# Patient Record
Sex: FEMALE | Race: OTHER | HISPANIC OR LATINO | Employment: FULL TIME | URBAN - METROPOLITAN AREA
[De-identification: names, ages, dates, MRNs, and addresses within clinical notes are randomized per-mention and may not be internally consistent; named-entity substitution may affect disease eponyms.]

---

## 2022-04-13 ENCOUNTER — OFFICE VISIT (OUTPATIENT)
Dept: OBGYN CLINIC | Facility: CLINIC | Age: 27
End: 2022-04-13
Payer: COMMERCIAL

## 2022-04-13 VITALS
SYSTOLIC BLOOD PRESSURE: 118 MMHG | DIASTOLIC BLOOD PRESSURE: 80 MMHG | WEIGHT: 121 LBS | BODY MASS INDEX: 23.75 KG/M2 | HEIGHT: 60 IN

## 2022-04-13 DIAGNOSIS — Z01.419 ENCOUNTER FOR GYNECOLOGICAL EXAMINATION (GENERAL) (ROUTINE) WITHOUT ABNORMAL FINDINGS: Primary | ICD-10-CM

## 2022-04-13 DIAGNOSIS — E01.0 THYROMEGALY: ICD-10-CM

## 2022-04-13 PROCEDURE — 0503F POSTPARTUM CARE VISIT: CPT | Performed by: PHYSICIAN ASSISTANT

## 2022-04-13 PROCEDURE — G0143 SCR C/V CYTO,THINLAYER,RESCR: HCPCS | Performed by: PHYSICIAN ASSISTANT

## 2022-04-13 PROCEDURE — 99385 PREV VISIT NEW AGE 18-39: CPT | Performed by: PHYSICIAN ASSISTANT

## 2022-04-13 NOTE — PROGRESS NOTES
Assessment/Plan   Diagnoses and all orders for this visit:    Encounter for gynecological examination (general) (routine) without abnormal findings  -     Liquid-based pap, screening  The current ASCCP guidelines were reviewed  Patient's last pap was around 2020 - WNL per patient and therefore, a pap with reflex HPV cotesting is indicated at this time  I emphasized the importance of an annual pelvic and breast exam  Patient ok to have a pap done today  Thyromegaly  Ordered thyroid studies and thyroid ultrasound - will call to review results    Discussion  I have discussed the importance of monthly self-breast exams, exercise and healthy diet as well as adequate intake of calcium and vitamin D  Encourage MVI q day and r/zack importance of folic acid; Encourage 30-40 min weight bearing exercise most days of week  Encourage safe sexual practices; STI testing - declines  Contraception - BTL; inquired if desires future pregnancy - would need to consult reproductive endocrinology/fertility specialist - would discuss if tubal reversal is possible - risks/benefits vs IVF; The patient has had the Gardasil vaccine series, which is recommended for patients from 545 years of age  All questions have been answered to her satisfaction  RTO for APE or sooner if needed    Subjective     HPI   Brenda Res is a 31 y o  female who presents for annual well woman exam as a new patient  Menarche - 14; LMP - 3/31/22; Periods are reg q month and last 3-4 days; No excessive bleeding; No intermenstrual bleeding or spotting; Cramps are tolerable  No vulvar itch/burn; No vaginal itch/burn; No abn discharge or odor; No urinary sx - burning/pain/frequency/hematuria  (+) SBEs - no breast masses, asymmetry, nipple discharge or bleeding, changes in skin of breast, or breast tenderness bilaterally  No abd/pelvic pain or HAs;   Pt is sexually active in a mutually monog/ sexual relationship; No issues with intercourse;  She declines sti/hiv/hep testing; Feels safe at home  Current contraception: BTL; inquired if desires future pregnancy  Gardasil - completed series  (+) PCP for routine Bw/care; Last Pap -  - WNL per patient  History of abnormal Pap smear: no    Review of Systems   Constitutional: Negative for activity change, fatigue, fever and unexpected weight change  HENT: Negative for congestion, dental problem, sinus pressure and sinus pain  Eyes: Negative for visual disturbance  Respiratory: Negative for cough, shortness of breath and wheezing  Cardiovascular: Negative for chest pain and leg swelling  Gastrointestinal: Negative for abdominal distention, abdominal pain, blood in stool, constipation, diarrhea, nausea and vomiting  Endocrine: Negative for polydipsia  Genitourinary: Negative for difficulty urinating, dyspareunia, dysuria, frequency, hematuria, menstrual problem, pelvic pain, urgency, vaginal bleeding, vaginal discharge and vaginal pain  Musculoskeletal: Negative for arthralgias and back pain  Allergic/Immunologic: Negative for environmental allergies  Neurological: Negative for dizziness, seizures and headaches  Psychiatric/Behavioral: Negative for dysphoric mood and sleep disturbance  The patient is not nervous/anxious  The following portions of the patient's history were reviewed and updated as appropriate: allergies, current medications, past family history, past medical history, past social history, past surgical history and problem list          OB History        4    Para   3    Term   2       1    AB   1    Living   3       SAB   0    IAB   1    Ectopic        Multiple        Live Births   3           Obstetric Comments   : 1 prim [de-identified] F unsure reason since done in ;  repeat LTCS ;  T18 22 wks F D&E;  repeat C/S with BTL F 33wks  labor; History reviewed  No pertinent past medical history      Past Surgical History:   Procedure Laterality Date     SECTION      x 3    GASTRIC BYPASS      TUBAL LIGATION         Family History   Problem Relation Age of Onset    No Known Problems Mother     No Known Problems Father        Social History     Socioeconomic History    Marital status: /Civil Union     Spouse name: Not on file    Number of children: Not on file    Years of education: Not on file    Highest education level: Not on file   Occupational History    Not on file   Tobacco Use    Smoking status: Never Smoker    Smokeless tobacco: Never Used   Vaping Use    Vaping Use: Never used   Substance and Sexual Activity    Alcohol use: Not Currently    Drug use: Not Currently    Sexual activity: Yes     Partners: Male     Birth control/protection: Female Sterilization     Comment:  BTL with last C/S   Other Topics Concern    Not on file   Social History Narrative    Not on file     Social Determinants of Health     Financial Resource Strain: Not on file   Food Insecurity: Not on file   Transportation Needs: Not on file   Physical Activity: Not on file   Stress: Not on file   Social Connections: Not on file   Intimate Partner Violence: Not on file   Housing Stability: Not on file         Current Outpatient Medications:     MULTIPLE VITAMIN PO, Take by mouth, Disp: , Rfl:     No Known Allergies    Objective   Vitals:    22 1000   BP: 118/80   BP Location: Left arm   Patient Position: Sitting   Cuff Size: Standard   Weight: 54 9 kg (121 lb)   Height: 5' (1 524 m)     Physical Exam  Vitals reviewed  Constitutional:       General: She is awake  She is not in acute distress  Appearance: Normal appearance  She is well-developed, well-groomed and normal weight  She is not ill-appearing, toxic-appearing or diaphoretic  HENT:      Head: Normocephalic and atraumatic  Eyes:      Conjunctiva/sclera: Conjunctivae normal    Neck:      Thyroid: Thyromegaly (B/L) present   No thyroid mass or thyroid tenderness  Cardiovascular:      Rate and Rhythm: Normal rate and regular rhythm  Heart sounds: Normal heart sounds  No murmur heard  Pulmonary:      Effort: Pulmonary effort is normal  No tachypnea, bradypnea or respiratory distress  Breath sounds: Normal breath sounds  No stridor or decreased air movement  No wheezing  Chest:   Breasts: Breasts are symmetrical       Right: Normal  No swelling, bleeding, inverted nipple, mass, nipple discharge, skin change, tenderness, axillary adenopathy or supraclavicular adenopathy  Left: Normal  No swelling, bleeding, inverted nipple, mass, nipple discharge, skin change, tenderness, axillary adenopathy or supraclavicular adenopathy  Abdominal:      General: There is no distension  Palpations: Abdomen is soft  There is no hepatomegaly, splenomegaly or mass  Tenderness: There is no abdominal tenderness  Hernia: No hernia is present  There is no hernia in the left inguinal area or right inguinal area  Genitourinary:     General: Normal vulva  Exam position: Supine  Pubic Area: No rash or pubic lice  Labia:         Right: No rash, tenderness, lesion or injury  Left: No rash, tenderness, lesion or injury  Urethra: No prolapse, urethral pain, urethral swelling or urethral lesion  Vagina: Normal  No signs of injury and foreign body  No vaginal discharge, erythema, tenderness, bleeding, lesions or prolapsed vaginal walls  Cervix: No cervical motion tenderness, discharge, friability, lesion, erythema or cervical bleeding  Uterus: Not deviated, not enlarged, not fixed, not tender and no uterine prolapse  Adnexa:         Right: No mass, tenderness or fullness  Left: No mass, tenderness or fullness  Comments: Needed long speculum to see cervix  Lymphadenopathy:      Cervical: No cervical adenopathy        Upper Body:      Right upper body: No supraclavicular or axillary adenopathy  Left upper body: No supraclavicular or axillary adenopathy  Lower Body: No right inguinal adenopathy  No left inguinal adenopathy  Skin:     General: Skin is warm and dry  Neurological:      Mental Status: She is alert and oriented to person, place, and time  Psychiatric:         Mood and Affect: Mood and affect normal          Speech: Speech normal          Behavior: Behavior normal  Behavior is cooperative  Thought Content:  Thought content normal          Judgment: Judgment normal

## 2022-04-13 NOTE — ASSESSMENT & PLAN NOTE
The current ASCCP guidelines were reviewed  Patient's last pap was around 2020 - WNL per patient and therefore, a pap with reflex HPV cotesting is indicated at this time  I emphasized the importance of an annual pelvic and breast exam  Patient ok to have a pap done today

## 2022-04-20 LAB
LAB AP GYN PRIMARY INTERPRETATION: NORMAL
LAB AP LMP: NORMAL
Lab: NORMAL

## 2022-09-07 ENCOUNTER — TELEPHONE (OUTPATIENT)
Dept: OTHER | Facility: OTHER | Age: 27
End: 2022-09-07

## 2022-10-14 ENCOUNTER — OFFICE VISIT (OUTPATIENT)
Dept: FAMILY MEDICINE CLINIC | Facility: CLINIC | Age: 27
End: 2022-10-14
Payer: COMMERCIAL

## 2022-10-14 VITALS
TEMPERATURE: 97.2 F | WEIGHT: 137 LBS | BODY MASS INDEX: 26.9 KG/M2 | HEIGHT: 60 IN | SYSTOLIC BLOOD PRESSURE: 110 MMHG | DIASTOLIC BLOOD PRESSURE: 82 MMHG

## 2022-10-14 DIAGNOSIS — R53.83 FATIGUE, UNSPECIFIED TYPE: ICD-10-CM

## 2022-10-14 DIAGNOSIS — R14.0 ABDOMINAL BLOATING: ICD-10-CM

## 2022-10-14 DIAGNOSIS — Z13.1 SCREENING FOR DIABETES MELLITUS: ICD-10-CM

## 2022-10-14 DIAGNOSIS — R55 SYNCOPE, UNSPECIFIED SYNCOPE TYPE: ICD-10-CM

## 2022-10-14 DIAGNOSIS — Z00.00 ANNUAL PHYSICAL EXAM: Primary | ICD-10-CM

## 2022-10-14 DIAGNOSIS — Z13.220 SCREENING FOR LIPID DISORDERS: ICD-10-CM

## 2022-10-14 PROCEDURE — 99214 OFFICE O/P EST MOD 30 MIN: CPT

## 2022-10-14 PROCEDURE — 99385 PREV VISIT NEW AGE 18-39: CPT

## 2022-10-14 NOTE — PATIENT INSTRUCTIONS

## 2022-10-14 NOTE — ASSESSMENT & PLAN NOTE
Not on any medications, denies cardiac symtpoms  Check labs to r/o electrolyte imbalance, abnormal blood glucose, anemia  BP stable today advised to monitor at home

## 2022-10-14 NOTE — ASSESSMENT & PLAN NOTE
Hx of gastric sleeve last year, denies abdominal pain  Discussed avoiding trigger foods and starting pro-biotics  Eating smaller more frequent meals and avoiding carbonated beverages   Will f/u in 6 weeks

## 2022-10-14 NOTE — PROGRESS NOTES
[facility]    NAME: Toby Aquino  AGE: 31 y o  SEX: female  : 1995     DATE: 10/14/2022     Assessment and Plan:     Problem List Items Addressed This Visit        Other    Annual physical exam - Primary     Normal exam  Routine blood work ordered  Continue exercise discussed healthy eating habits  Syncope     Not on any medications, denies cardiac symtpoms  Check labs to r/o electrolyte imbalance, abnormal blood glucose, anemia  BP stable today advised to monitor at home  Relevant Orders    CBC and differential    Basic metabolic panel    Vitamin B12    Ferritin    Abdominal bloating     Hx of gastric sleeve last year, denies abdominal pain  Discussed avoiding trigger foods and starting pro-biotics  Eating smaller more frequent meals and avoiding carbonated beverages  Will f/u in 6 weeks         Fatigue     Blood work ordered  Relevant Orders    Vitamin B12      Other Visit Diagnoses     BMI 26 0-26 9,adult        Screening for diabetes mellitus        Relevant Orders    Hemoglobin A1C    Screening for lipid disorders        Relevant Orders    Lipid panel          Immunizations and preventive care screenings were discussed with patient today  Appropriate education was printed on patient's after visit summary  Counseling:  Alcohol/drug use: discussed moderation in alcohol intake, the recommendations for healthy alcohol use, and avoidance of illicit drug use  · Sexual health: discussed sexually transmitted diseases, partner selection, use of condoms, avoidance of unintended pregnancy, and contraceptive alternatives  BMI Counseling: Body mass index is 26 76 kg/m²  The BMI is above normal  Nutrition recommendations include encouraging healthy choices of fruits and vegetables and limiting drinks that contain sugar  Exercise recommendations include exercising 3-5 times per week   Rationale for BMI follow-up plan is due to patient being overweight or obese  Depression Screening and Follow-up Plan: Patient was screened for depression during today's encounter  They screened negative with a PHQ-2 score of 0  Return in about 6 weeks (around 11/25/2022), or if symptoms worsen or fail to improve, for Recheck abdominal discomfort  Chief Complaint:     Chief Complaint   Patient presents with   • Physical Exam      History of Present Illness:     Adult Annual Physical   Patient here for a comprehensive physical exam and to establish care  The patient reports problems - Stomach issues had gastric sleeve done last year in [facility]  Has been having abdominal bloating and gurgling  Passed out several times in the past month states she gets dizzy and has syncopal episodes  Denies any family hx of heart disease, arrhythmias, and denies pregnancy secondary to tubal ligation  She is not on any medications except multi-vitamins  Will check blood work to r/o anemia, abnormal blood glucose, electrolyte imbalance  Diet and Physical Activity  · Diet/Nutrition: well balanced diet, limited junk food, consuming 3-5 servings of fruits/vegetables daily, limited fruits/vegetables and adequate whole grain intake  · Exercise: moderate cardiovascular exercise, vigorous cardiovascular exercise, 3-4 times a week on average and 30-60 minutes on average  Depression Screening  PHQ-2/9 Depression Screening    Little interest or pleasure in doing things: 0 - not at all  Feeling down, depressed, or hopeless: 0 - not at all  PHQ-2 Score: 0  PHQ-2 Interpretation: Negative depression screen       General Health  · Sleep: sleeps well and gets 7-8 hours of sleep on average  · Hearing: normal - bilateral   · Vision: no vision problems, goes for regular eye exams and wears glasses  · Dental: regular dental visits and brushes teeth twice daily         /GYN Health  · Last menstrual period: 10/11/2022  · Contraceptive method: Tubal ligation  · History of STDs?: no      Review of Systems:     Review of Systems   Constitutional: Positive for fatigue  Negative for activity change, chills and fever  HENT: Negative for congestion, ear pain and sore throat  Eyes: Negative for pain, discharge and visual disturbance  Respiratory: Negative for cough, chest tightness and shortness of breath  Cardiovascular: Negative for chest pain and palpitations  Gastrointestinal: Negative for abdominal distention, abdominal pain, diarrhea, nausea, rectal pain and vomiting  Endocrine: Negative for cold intolerance  Genitourinary: Negative for difficulty urinating, dysuria, flank pain, frequency, genital sores and hematuria  Musculoskeletal: Negative for arthralgias and back pain  Skin: Negative for color change and rash  Allergic/Immunologic: Negative for environmental allergies  Neurological: Positive for dizziness, syncope and light-headedness  Negative for seizures  Psychiatric/Behavioral: Negative for agitation  All other systems reviewed and are negative  Past Medical History:     History reviewed  No pertinent past medical history     Past Surgical History:     Past Surgical History:   Procedure Laterality Date   •  SECTION      x 3   • GASTRIC BYPASS     • TUBAL LIGATION        Social History:     Social History     Socioeconomic History   • Marital status: /Civil Union     Spouse name: None   • Number of children: None   • Years of education: None   • Highest education level: None   Occupational History   • None   Tobacco Use   • Smoking status: Never Smoker   • Smokeless tobacco: Never Used   Vaping Use   • Vaping Use: Never used   Substance and Sexual Activity   • Alcohol use: Not Currently   • Drug use: Not Currently   • Sexual activity: Yes     Partners: Male     Birth control/protection: Female Sterilization     Comment:  BTL with last C/S   Other Topics Concern   • None   Social History Narrative   • None     Social Determinants of Health     Financial Resource Strain: Not on file   Food Insecurity: Not on file   Transportation Needs: Not on file   Physical Activity: Not on file   Stress: Not on file   Social Connections: Not on file   Intimate Partner Violence: Not on file   Housing Stability: Not on file      Family History:     Family History   Problem Relation Age of Onset   • No Known Problems Mother    • No Known Problems Father       Current Medications:     Current Outpatient Medications   Medication Sig Dispense Refill   • MULTIPLE VITAMIN PO Take by mouth       No current facility-administered medications for this visit  Allergies:     No Known Allergies   Physical Exam:     /82 (BP Location: Right arm, Patient Position: Sitting, Cuff Size: Standard)   Temp (!) 97 2 °F (36 2 °C) (Temporal)   Ht 5' (1 524 m)   Wt 62 1 kg (137 lb)   BMI 26 76 kg/m²     Physical Exam  Vitals and nursing note reviewed  Constitutional:       General: She is not in acute distress  Appearance: Normal appearance  She is well-developed and normal weight  She is not ill-appearing or toxic-appearing  HENT:      Head: Normocephalic and atraumatic  Right Ear: Tympanic membrane, ear canal and external ear normal  There is no impacted cerumen  Left Ear: Tympanic membrane, ear canal and external ear normal  There is no impacted cerumen  Nose: Nose normal  No congestion or rhinorrhea  Mouth/Throat:      Mouth: Mucous membranes are moist       Pharynx: No oropharyngeal exudate or posterior oropharyngeal erythema  Eyes:      General: No scleral icterus  Right eye: No discharge  Left eye: No discharge  Extraocular Movements: Extraocular movements intact  Conjunctiva/sclera: Conjunctivae normal       Pupils: Pupils are equal, round, and reactive to light  Neck:      Vascular: No carotid bruit     Cardiovascular:      Rate and Rhythm: Normal rate and regular rhythm  Chest Wall: PMI is not displaced  Pulses: Normal pulses  Heart sounds: Normal heart sounds, S1 normal and S2 normal  No murmur heard  No friction rub  No gallop  Pulmonary:      Effort: Pulmonary effort is normal  No respiratory distress  Breath sounds: Normal breath sounds  No wheezing  Chest:      Chest wall: No tenderness  Abdominal:      General: Abdomen is flat  Bowel sounds are normal  There is no distension  Palpations: Abdomen is soft  Tenderness: There is no abdominal tenderness  There is no right CVA tenderness, left CVA tenderness or rebound  Hernia: No hernia is present  Genitourinary:     Vagina: No vaginal discharge  Musculoskeletal:         General: No swelling, tenderness, deformity or signs of injury  Normal range of motion  Cervical back: Normal range of motion and neck supple  No rigidity or tenderness  Right lower leg: No edema  Left lower leg: No edema  Lymphadenopathy:      Cervical: No cervical adenopathy  Skin:     General: Skin is warm and dry  Capillary Refill: Capillary refill takes less than 2 seconds  Findings: No erythema, lesion or rash  Neurological:      General: No focal deficit present  Mental Status: She is alert and oriented to person, place, and time  Mental status is at baseline  Cranial Nerves: No cranial nerve deficit  Sensory: No sensory deficit  Motor: No weakness  Coordination: Coordination normal       Gait: Gait normal       Deep Tendon Reflexes: Reflexes normal    Psychiatric:         Mood and Affect: Mood normal          Behavior: Behavior normal          Thought Content:  Thought content normal          Judgment: Judgment normal           TYRA Rao   [address] [place] [address]

## 2022-10-17 ENCOUNTER — APPOINTMENT (OUTPATIENT)
Dept: LAB | Facility: CLINIC | Age: 27
End: 2022-10-17
Payer: COMMERCIAL

## 2022-10-17 DIAGNOSIS — Z13.1 SCREENING FOR DIABETES MELLITUS: ICD-10-CM

## 2022-10-17 DIAGNOSIS — E01.0 THYROMEGALY: ICD-10-CM

## 2022-10-17 DIAGNOSIS — Z13.220 SCREENING FOR LIPID DISORDERS: ICD-10-CM

## 2022-10-17 DIAGNOSIS — R55 SYNCOPE, UNSPECIFIED SYNCOPE TYPE: ICD-10-CM

## 2022-10-17 DIAGNOSIS — R53.83 FATIGUE, UNSPECIFIED TYPE: ICD-10-CM

## 2022-10-17 LAB
ANION GAP SERPL CALCULATED.3IONS-SCNC: 7 MMOL/L (ref 4–13)
BASOPHILS # BLD AUTO: 0.02 THOUSANDS/ΜL (ref 0–0.1)
BASOPHILS NFR BLD AUTO: 0 % (ref 0–1)
BUN SERPL-MCNC: 11 MG/DL (ref 5–25)
CALCIUM SERPL-MCNC: 9.7 MG/DL (ref 8.4–10.2)
CHLORIDE SERPL-SCNC: 104 MMOL/L (ref 96–108)
CHOLEST SERPL-MCNC: 169 MG/DL
CO2 SERPL-SCNC: 27 MMOL/L (ref 21–32)
CREAT SERPL-MCNC: 0.62 MG/DL (ref 0.6–1.3)
EOSINOPHIL # BLD AUTO: 0.07 THOUSAND/ΜL (ref 0–0.61)
EOSINOPHIL NFR BLD AUTO: 1 % (ref 0–6)
ERYTHROCYTE [DISTWIDTH] IN BLOOD BY AUTOMATED COUNT: 13.3 % (ref 11.6–15.1)
FERRITIN SERPL-MCNC: 13 NG/ML (ref 8–388)
GFR SERPL CREATININE-BSD FRML MDRD: 123 ML/MIN/1.73SQ M
GLUCOSE P FAST SERPL-MCNC: 74 MG/DL (ref 65–99)
HCT VFR BLD AUTO: 39.6 % (ref 34.8–46.1)
HDLC SERPL-MCNC: 52 MG/DL
HGB BLD-MCNC: 12.9 G/DL (ref 11.5–15.4)
IMM GRANULOCYTES # BLD AUTO: 0.02 THOUSAND/UL (ref 0–0.2)
IMM GRANULOCYTES NFR BLD AUTO: 0 % (ref 0–2)
LDLC SERPL CALC-MCNC: 107 MG/DL (ref 0–100)
LYMPHOCYTES # BLD AUTO: 2.46 THOUSANDS/ΜL (ref 0.6–4.47)
LYMPHOCYTES NFR BLD AUTO: 38 % (ref 14–44)
MCH RBC QN AUTO: 29.1 PG (ref 26.8–34.3)
MCHC RBC AUTO-ENTMCNC: 32.6 G/DL (ref 31.4–37.4)
MCV RBC AUTO: 89 FL (ref 82–98)
MONOCYTES # BLD AUTO: 0.52 THOUSAND/ΜL (ref 0.17–1.22)
MONOCYTES NFR BLD AUTO: 8 % (ref 4–12)
NEUTROPHILS # BLD AUTO: 3.43 THOUSANDS/ΜL (ref 1.85–7.62)
NEUTS SEG NFR BLD AUTO: 53 % (ref 43–75)
NONHDLC SERPL-MCNC: 117 MG/DL
NRBC BLD AUTO-RTO: 0 /100 WBCS
PLATELET # BLD AUTO: 274 THOUSANDS/UL (ref 149–390)
PMV BLD AUTO: 10.4 FL (ref 8.9–12.7)
POTASSIUM SERPL-SCNC: 3.8 MMOL/L (ref 3.5–5.3)
RBC # BLD AUTO: 4.43 MILLION/UL (ref 3.81–5.12)
SODIUM SERPL-SCNC: 138 MMOL/L (ref 135–147)
TRIGL SERPL-MCNC: 50 MG/DL
TSH SERPL DL<=0.05 MIU/L-ACNC: 2.62 UIU/ML (ref 0.45–4.5)
VIT B12 SERPL-MCNC: 300 PG/ML (ref 100–900)
WBC # BLD AUTO: 6.52 THOUSAND/UL (ref 4.31–10.16)

## 2022-10-17 PROCEDURE — 80048 BASIC METABOLIC PNL TOTAL CA: CPT

## 2022-10-17 PROCEDURE — 80061 LIPID PANEL: CPT

## 2022-10-17 PROCEDURE — 82607 VITAMIN B-12: CPT

## 2022-10-17 PROCEDURE — 36415 COLL VENOUS BLD VENIPUNCTURE: CPT

## 2022-10-17 PROCEDURE — 85025 COMPLETE CBC W/AUTO DIFF WBC: CPT

## 2022-10-17 PROCEDURE — 82728 ASSAY OF FERRITIN: CPT

## 2022-10-17 PROCEDURE — 84443 ASSAY THYROID STIM HORMONE: CPT

## 2022-10-17 PROCEDURE — 83036 HEMOGLOBIN GLYCOSYLATED A1C: CPT

## 2022-10-18 LAB
EST. AVERAGE GLUCOSE BLD GHB EST-MCNC: 100 MG/DL
HBA1C MFR BLD: 5.1 %

## 2022-12-01 PROBLEM — Z00.00 ANNUAL PHYSICAL EXAM: Status: RESOLVED | Noted: 2022-10-14 | Resolved: 2022-12-01

## 2022-12-01 PROBLEM — Z01.419 ENCOUNTER FOR GYNECOLOGICAL EXAMINATION (GENERAL) (ROUTINE) WITHOUT ABNORMAL FINDINGS: Status: RESOLVED | Noted: 2022-04-13 | Resolved: 2022-12-01

## 2023-03-06 ENCOUNTER — HOSPITAL ENCOUNTER (EMERGENCY)
Facility: HOSPITAL | Age: 28
Discharge: HOME | End: 2023-03-06
Attending: INTERNAL MEDICINE

## 2023-03-06 ENCOUNTER — APPOINTMENT (EMERGENCY)
Dept: ULTRASOUND IMAGING | Facility: HOSPITAL | Age: 28
End: 2023-03-06

## 2023-03-06 VITALS
HEART RATE: 91 BPM | OXYGEN SATURATION: 98 % | DIASTOLIC BLOOD PRESSURE: 57 MMHG | SYSTOLIC BLOOD PRESSURE: 100 MMHG | TEMPERATURE: 98.5 F | RESPIRATION RATE: 18 BRPM

## 2023-03-06 DIAGNOSIS — R10.30 LOWER ABDOMINAL PAIN: Primary | ICD-10-CM

## 2023-03-06 DIAGNOSIS — N83.201 CYST OF RIGHT OVARY: ICD-10-CM

## 2023-03-06 LAB
EXT PREGNANCY TEST URINE: NEGATIVE
EXT. CONTROL: NORMAL

## 2023-03-06 NOTE — Clinical Note
Roxanne Padgett was seen and treated in our emergency department on 3/6/2023  Diagnosis:     Alfred Diane  may return to work on return date  She may return on this date: 03/07/2023         If you have any questions or concerns, please don't hesitate to call        Cole Jhaveri MD    ______________________________           _______________          _______________  Hospital Representative                              Date                                Time

## 2023-03-06 NOTE — ED PROVIDER NOTES
History  Chief Complaint   Patient presents with   • Pelvic Pain     Pt c/o intermittent pelvic pain that started about a week ago and lower abdominal pain  Pt states there is a chance of pregnancy, + nausea/vomiting, - diarrhea  LMP approx 1 month ago     Kiko Juan is a 48-year-old female presenting due to right lower quadrant/lower abdominal pelvic pain  Patient history of gastric sleeve done in [facility] ([place] about a year ago, states 3 weeks ago she started having right lower quadrant abdominal pain, on and off throughout the day, worsened by eating and lifting heavy objects with nausea and vomiting  Nausea and vomiting is typically after eating but has also had episodes of waking up from sleep and vomiting bilious vomit  Patient denies fever, chills, recent illness, diarrhea, vaginal discharge/bleeding/pain, rash, trauma, chest pain, shortness of breath  Prior to Admission Medications   Prescriptions Last Dose Informant Patient Reported? Taking? MULTIPLE VITAMIN PO   Yes No   Sig: Take by mouth      Facility-Administered Medications: None       History reviewed  No pertinent past medical history  Past Surgical History:   Procedure Laterality Date   •  SECTION      x 3   • GASTRIC BYPASS     • TUBAL LIGATION  2020       Family History   Problem Relation Age of Onset   • No Known Problems Mother    • No Known Problems Father      I have reviewed and agree with the history as documented  E-Cigarette/Vaping   • E-Cigarette Use Never User      E-Cigarette/Vaping Substances     Social History     Tobacco Use   • Smoking status: Never   • Smokeless tobacco: Never   Vaping Use   • Vaping Use: Never used   Substance Use Topics   • Alcohol use: Not Currently   • Drug use: Not Currently        Review of Systems   Constitutional: Negative for chills and fever  HENT: Negative for congestion, ear pain, rhinorrhea and sore throat  Eyes: Negative for pain and visual disturbance     Respiratory: Negative for cough and shortness of breath  Cardiovascular: Negative for chest pain and palpitations  Gastrointestinal: Positive for nausea and vomiting  Negative for abdominal pain, blood in stool, constipation and diarrhea  Genitourinary: Positive for pelvic pain  Negative for dysuria, flank pain, hematuria, vaginal bleeding, vaginal discharge and vaginal pain  Musculoskeletal: Negative for arthralgias, back pain, neck pain and neck stiffness  Skin: Negative for color change and rash  Neurological: Negative for dizziness, seizures, syncope, facial asymmetry, weakness, light-headedness and headaches  All other systems reviewed and are negative  Physical Exam  ED Triage Vitals [03/06/23 1457]   Temperature Pulse Respirations Blood Pressure SpO2   98 5 °F (36 9 °C) 91 18 100/57 98 %      Temp Source Heart Rate Source Patient Position - Orthostatic VS BP Location FiO2 (%)   Oral Monitor Sitting Right arm --      Pain Score       7             Orthostatic Vital Signs  Vitals:    03/06/23 1457   BP: 100/57   Pulse: 91   Patient Position - Orthostatic VS: Sitting       Physical Exam  Vitals and nursing note reviewed  Constitutional:       General: She is not in acute distress  Appearance: She is well-developed  HENT:      Head: Normocephalic and atraumatic  Nose: Nose normal  No congestion  Eyes:      Extraocular Movements: Extraocular movements intact  Conjunctiva/sclera: Conjunctivae normal    Cardiovascular:      Rate and Rhythm: Normal rate and regular rhythm  Pulses: Normal pulses  Heart sounds: Normal heart sounds  No murmur heard  Pulmonary:      Effort: Pulmonary effort is normal  No respiratory distress  Breath sounds: Normal breath sounds  Chest:      Chest wall: No tenderness  Abdominal:      General: Abdomen is flat  Bowel sounds are normal       Palpations: Abdomen is soft  Tenderness:  There is abdominal tenderness (Right lower quadrant/pelvic abdominal tenderness on exam )  There is no right CVA tenderness or left CVA tenderness  Musculoskeletal:         General: No deformity or signs of injury  Normal range of motion  Cervical back: Normal range of motion and neck supple  No rigidity or tenderness  Skin:     General: Skin is warm and dry  Findings: No bruising, lesion or rash  Neurological:      General: No focal deficit present  Mental Status: She is alert  Cranial Nerves: No cranial nerve deficit  Sensory: No sensory deficit  ED Medications  Medications - No data to display    Diagnostic Studies  Results Reviewed     Procedure Component Value Units Date/Time    POCT pregnancy, urine [[id number]]  (Normal) Resulted: 03/06/23 1626    Lab Status: Final result Updated: 03/06/23 1626     EXT Preg Test, Ur Negative     Control Valid                 US pelvis complete w transvaginal   Final Result by Linda Josue MD (03/06 1741)       No sonographic evidence for ovarian torsion  No evidence of ovarian cyst    Right small paraovarian 1 1 cm simple cyst                            Workstation performed: [id number]               Procedures  Procedures      ED Course  ED Course as of 03/06/23 2241   Mon Mar 06, 2023   1631 POCT pregnancy, urine  Negative   125 25-year-old female presenting due to lower abdominal pain for 3 weeks  History of gastric band surgery  Physical exam shows slight right lower quadrant with no external signs of rash or trauma  Patient does not appear to be in distress, will order pelvic ultrasound for evaluation of ovarian torsion versus ovarian cyst   Appendicitis less likely due to duration of pain and minimal tenderness on exam   Patient does have history of tubal ligation, urine pregnancy negative in emergency department   9008 FINDINGS:     UTERUS:  The uterus is retroverted in position, measuring 9 0 x 3 4 x 5 2 cm  Nonspecific diffusely heterogeneous uterine echotexture    The cervix appears within normal limits      ENDOMETRIUM:    The endometrial echo complex has an AP caliber of 9 0 mm  Its appearance is within normal limits for age and cycle and shows no filling defects        OVARIES/ADNEXA:  Right ovary:  3 0 x 2 9 x 1 6 cm  7 4 mL  Left ovary:  2 1 x 1 7 x 2 5 cm  4 6 mL  Ovarian Doppler flow is within normal limits  No suspicious ovarian or adnexal abnormality      OTHER:  No free fluid or loculated fluid collections  Right small paraovarian 1 1 cm simple cyst         IMPRESSION:     No sonographic evidence for ovarian torsion  No evidence of ovarian cyst   Right small paraovarian 1 1 cm simple cyst      Workstation performed: [id number]   [id number] No current signs of ovarian torsion, small right sided 1 1 cm paraovarian simple cyst seen  Recommend follow up with Ob/gyn for reassessment  Monitor symptoms and contact PCP or return to the ED for new or worsening symptoms  Pt is agreeable and appropriate for d/c  MDM      Disposition  Final diagnoses:   Lower abdominal pain   Cyst of right ovary     Time reflects when diagnosis was documented in both MDM as applicable and the Disposition within this note     Time User Action Codes Description Comment    3/6/2023  5:59 PM Anuradha Mynor Add [R10 30] Lower abdominal pain     3/6/2023  5:59 PM Anuradha Mynor Add [F18 176] Torsion of paraovarian cyst     3/6/2023  5:59 PM Anuradha Mynor Remove [L59 944] Torsion of paraovarian cyst     3/6/2023  6:00 PM Anuradha Mynor Add [W84 106] Cyst of right ovary       ED Disposition     ED Disposition   Discharge    Condition   Stable    Date/Time   Mon Mar 6, 2023  6:03 PM    Comment   Britney Push discharge to home/self care                 Follow-up Information     Follow up With Specialties Details Why Contact Info Additional Information    [address] [facility] Obstetrics and Gynecology   [address]  [address] [phone] NT [facility], [address] [facility], [place], [place], [address] Emergency Department Emergency Medicine   [address] Emergency Department, [address], [place], [place], [address]          Discharge Medication List as of 3/6/2023  6:05 PM      CONTINUE these medications which have NOT CHANGED    Details   MULTIPLE VITAMIN PO Take by mouth, Historical Med           No discharge procedures on file  PDMP Review     None           ED Provider  Attending physically available and evaluated Dayna Nick  I managed the patient along with the ED Attending      Electronically Signed by         Pricilla Fernandez MD  03/06/23 7615

## 2023-03-06 NOTE — DISCHARGE INSTRUCTIONS
Follow up with your Ob/Gyn for reassessment and management of symptoms  Thank you for allowing us to take part in your care

## 2023-03-08 NOTE — ED ATTENDING ATTESTATION
3/6/2023  Chester BURKETT MD, saw and evaluated the patient  I have discussed the patient with the resident/non-physician practitioner and agree with the resident's/non-physician practitioner's findings, Plan of Care, and MDM as documented in the resident's/non-physician practitioner's note, except where noted  All available labs and Radiology studies were reviewed  I was present for key portions of any procedure(s) performed by the resident/non-physician practitioner and I was immediately available to provide assistance  At this point I agree with the current assessment done in the Emergency Department    I have conducted an independent evaluation of this patient a history and physical is as follows:see h and p above -agree with er resident tx pan/ dispo    ED Course  ED Course as of 03/08/23 1142   Mon Mar 06, 2023   1630 Er md note - pt states is not nauseous at present- and has recently taken tylenol for rlq pain - and at present does not want anything else for pain          Critical Care Time  Procedures

## 2023-05-18 ENCOUNTER — APPOINTMENT (EMERGENCY)
Dept: CT IMAGING | Facility: HOSPITAL | Age: 28
End: 2023-05-18

## 2023-05-18 ENCOUNTER — HOSPITAL ENCOUNTER (EMERGENCY)
Facility: HOSPITAL | Age: 28
Discharge: HOME | End: 2023-05-19
Attending: EMERGENCY MEDICINE

## 2023-05-18 VITALS
WEIGHT: 141.09 LBS | BODY MASS INDEX: 27.56 KG/M2 | HEART RATE: 68 BPM | RESPIRATION RATE: 16 BRPM | SYSTOLIC BLOOD PRESSURE: 125 MMHG | DIASTOLIC BLOOD PRESSURE: 82 MMHG | TEMPERATURE: 98.1 F | OXYGEN SATURATION: 99 %

## 2023-05-18 DIAGNOSIS — R11.0 NAUSEA: ICD-10-CM

## 2023-05-18 DIAGNOSIS — K29.70 GASTRITIS: Primary | ICD-10-CM

## 2023-05-18 LAB
ALBUMIN SERPL BCP-MCNC: 4.1 G/DL (ref 3.5–5)
ALP SERPL-CCNC: 51 U/L (ref 34–104)
ALT SERPL W P-5'-P-CCNC: 8 U/L (ref 7–52)
ANION GAP SERPL CALCULATED.3IONS-SCNC: 5 MMOL/L (ref 4–13)
AST SERPL W P-5'-P-CCNC: 11 U/L (ref 13–39)
BASOPHILS # BLD AUTO: 0.02 THOUSANDS/ÂΜL (ref 0–0.1)
BASOPHILS NFR BLD AUTO: 0 % (ref 0–1)
BILIRUB SERPL-MCNC: 0.25 MG/DL (ref 0.2–1)
BILIRUB UR QL STRIP: NEGATIVE
BUN SERPL-MCNC: 14 MG/DL (ref 5–25)
CALCIUM SERPL-MCNC: 9.4 MG/DL (ref 8.4–10.2)
CHLORIDE SERPL-SCNC: 103 MMOL/L (ref 96–108)
CLARITY UR: CLEAR
CO2 SERPL-SCNC: 29 MMOL/L (ref 21–32)
COLOR UR: NORMAL
CREAT SERPL-MCNC: 0.54 MG/DL (ref 0.6–1.3)
EOSINOPHIL # BLD AUTO: 0.1 THOUSAND/ÂΜL (ref 0–0.61)
EOSINOPHIL NFR BLD AUTO: 1 % (ref 0–6)
ERYTHROCYTE [DISTWIDTH] IN BLOOD BY AUTOMATED COUNT: 13 % (ref 11.6–15.1)
EXT PREGNANCY TEST URINE: NEGATIVE
EXT. CONTROL: NORMAL
GFR SERPL CREATININE-BSD FRML MDRD: 128 ML/MIN/1.73SQ M
GLUCOSE SERPL-MCNC: 79 MG/DL (ref 65–140)
GLUCOSE UR STRIP-MCNC: NEGATIVE MG/DL
HCT VFR BLD AUTO: 37.1 % (ref 34.8–46.1)
HGB BLD-MCNC: 12 G/DL (ref 11.5–15.4)
HGB UR QL STRIP.AUTO: NEGATIVE
IMM GRANULOCYTES # BLD AUTO: 0.02 THOUSAND/UL (ref 0–0.2)
IMM GRANULOCYTES NFR BLD AUTO: 0 % (ref 0–2)
KETONES UR STRIP-MCNC: NEGATIVE MG/DL
LEUKOCYTE ESTERASE UR QL STRIP: NEGATIVE
LIPASE SERPL-CCNC: 38 U/L (ref 11–82)
LYMPHOCYTES # BLD AUTO: 3.23 THOUSANDS/ÂΜL (ref 0.6–4.47)
LYMPHOCYTES NFR BLD AUTO: 43 % (ref 14–44)
MCH RBC QN AUTO: 28.6 PG (ref 26.8–34.3)
MCHC RBC AUTO-ENTMCNC: 32.3 G/DL (ref 31.4–37.4)
MCV RBC AUTO: 89 FL (ref 82–98)
MONOCYTES # BLD AUTO: 0.58 THOUSAND/ÂΜL (ref 0.17–1.22)
MONOCYTES NFR BLD AUTO: 8 % (ref 4–12)
NEUTROPHILS # BLD AUTO: 3.59 THOUSANDS/ÂΜL (ref 1.85–7.62)
NEUTS SEG NFR BLD AUTO: 48 % (ref 43–75)
NITRITE UR QL STRIP: NEGATIVE
NRBC BLD AUTO-RTO: 0 /100 WBCS
PH UR STRIP.AUTO: 6.5 [PH]
PLATELET # BLD AUTO: 285 THOUSANDS/UL (ref 149–390)
PMV BLD AUTO: 9.8 FL (ref 8.9–12.7)
POTASSIUM SERPL-SCNC: 4.1 MMOL/L (ref 3.5–5.3)
PROT SERPL-MCNC: 7.4 G/DL (ref 6.4–8.4)
PROT UR STRIP-MCNC: NEGATIVE MG/DL
RBC # BLD AUTO: 4.19 MILLION/UL (ref 3.81–5.12)
SODIUM SERPL-SCNC: 137 MMOL/L (ref 135–147)
SP GR UR STRIP.AUTO: 1.03 (ref 1–1.03)
UROBILINOGEN UR STRIP-ACNC: <2 MG/DL
WBC # BLD AUTO: 7.54 THOUSAND/UL (ref 4.31–10.16)

## 2023-05-18 RX ORDER — KETOROLAC TROMETHAMINE 30 MG/ML
15 INJECTION, SOLUTION INTRAMUSCULAR; INTRAVENOUS ONCE
Status: COMPLETED | OUTPATIENT
Start: 2023-05-18 | End: 2023-05-18

## 2023-05-18 RX ORDER — FAMOTIDINE 10 MG/ML
20 INJECTION, SOLUTION INTRAVENOUS ONCE
Status: COMPLETED | OUTPATIENT
Start: 2023-05-18 | End: 2023-05-18

## 2023-05-18 RX ADMIN — KETOROLAC TROMETHAMINE 15 MG: 30 INJECTION, SOLUTION INTRAMUSCULAR at 20:23

## 2023-05-18 RX ADMIN — FAMOTIDINE 20 MG: 10 INJECTION, SOLUTION INTRAVENOUS at 20:24

## 2023-05-18 RX ADMIN — IOHEXOL 100 ML: 350 INJECTION, SOLUTION INTRAVENOUS at 22:31

## 2023-05-18 RX ADMIN — SODIUM CHLORIDE 1000 ML: 0.9 INJECTION, SOLUTION INTRAVENOUS at 20:29

## 2023-05-19 RX ORDER — FAMOTIDINE 20 MG/1
20 TABLET, FILM COATED ORAL DAILY
Qty: 3 TABLET | Refills: 0 | Status: SHIPPED | OUTPATIENT
Start: 2023-05-19 | End: 2023-05-22

## 2023-05-19 RX ORDER — ONDANSETRON 4 MG/1
4 TABLET, FILM COATED ORAL EVERY 8 HOURS PRN
Qty: 9 TABLET | Refills: 0 | Status: SHIPPED | OUTPATIENT
Start: 2023-05-19 | End: 2023-05-22

## 2023-05-19 NOTE — ED PROVIDER NOTES
"History  Chief Complaint   Patient presents with   • Abdominal Pain     Patient states she had chronic abd pain but worsened in the last 7 days  +N/V/D  Denies fevers  Hx: Bypass surgery      Patient is a 19-year-old female with a history of  section x3, gastric bypass 1 year prior to current ED presentation presents emergency departmentwith dull aching persistent worsening right lower quadrant abdominal pain for 4 months  Patient has associated symptoms of nausea symptoms beginning with the current presentation of right lower quadrant of abdomen  Patient states that she had a gastric bypass performed in [facility] 1 year prior to current presentation  Patient also reports that she went to her PCP for concerns of right lower quadrant abdominal pain with concerns of \"not digesting my food properly  \"  Patient denies palliative factors with provocative factors of pressure to right lower quadrant of abdomen  Patient has noneffective treatment  Patient denies fevers, chills, vomiting, diarrhea, constipation and urinary symptoms  Patient has recent fall recent trauma  Patient denies sick contacts recent travel  Patient has recent antibiotic use  Patient denies food allergy  Patient denies chest pain and shortness of breath  History provided by:  Patient   used: No        Prior to Admission Medications   Prescriptions Last Dose Informant Patient Reported? Taking? MULTIPLE VITAMIN PO   Yes No   Sig: Take by mouth      Facility-Administered Medications: None       History reviewed  No pertinent past medical history  Past Surgical History:   Procedure Laterality Date   •  SECTION      x 3   • GASTRIC BYPASS     • TUBAL LIGATION         Family History   Problem Relation Age of Onset   • No Known Problems Mother    • No Known Problems Father      I have reviewed and agree with the history as documented      E-Cigarette/Vaping   • E-Cigarette Use Never User  " E-Cigarette/Vaping Substances     Social History     Tobacco Use   • Smoking status: Never   • Smokeless tobacco: Never   Vaping Use   • Vaping Use: Never used   Substance Use Topics   • Alcohol use: Not Currently   • Drug use: Not Currently       Review of Systems   Constitutional: Negative for activity change, appetite change, chills and fever  HENT: Negative for congestion, postnasal drip, rhinorrhea, sinus pressure, sinus pain, sore throat and tinnitus  Eyes: Negative for photophobia and visual disturbance  Respiratory: Negative for cough, chest tightness and shortness of breath  Cardiovascular: Negative for chest pain and palpitations  Gastrointestinal: Positive for abdominal pain and nausea  Negative for constipation, diarrhea and vomiting  Genitourinary: Negative for difficulty urinating, dysuria, flank pain, frequency and urgency  Musculoskeletal: Negative for back pain, gait problem, neck pain and neck stiffness  Skin: Negative for pallor and rash  Allergic/Immunologic: Negative for environmental allergies and food allergies  Neurological: Negative for dizziness, weakness, numbness and headaches  Psychiatric/Behavioral: Negative for confusion  All other systems reviewed and are negative  Physical Exam  Physical Exam  Vitals and nursing note reviewed  Constitutional:       General: She is awake  Appearance: Normal appearance  She is well-developed  She is not ill-appearing, toxic-appearing or diaphoretic  Comments: /71 (BP Location: Right arm)   Pulse 100   Temp 98 1 °F (36 7 °C) (Oral)   Resp 16   Wt 64 kg (141 lb 1 5 oz)   SpO2 100%   BMI 27 56 kg/m²      HENT:      Head: Normocephalic and atraumatic  Right Ear: Hearing and external ear normal  No decreased hearing noted  No drainage, swelling or tenderness  No mastoid tenderness  Left Ear: Hearing and external ear normal  No decreased hearing noted  No drainage, swelling or tenderness  No mastoid tenderness  Nose: Nose normal       Mouth/Throat:      Lips: Pink  Mouth: Mucous membranes are moist       Pharynx: Oropharynx is clear  Uvula midline  Eyes:      General: Lids are normal  Vision grossly intact  Right eye: No discharge  Left eye: No discharge  Extraocular Movements: Extraocular movements intact  Conjunctiva/sclera: Conjunctivae normal       Pupils: Pupils are equal, round, and reactive to light  Neck:      Vascular: No JVD  Trachea: Trachea and phonation normal  No tracheal tenderness or tracheal deviation  Cardiovascular:      Rate and Rhythm: Normal rate and regular rhythm  Pulses: Normal pulses  Radial pulses are 2+ on the right side and 2+ on the left side  Posterior tibial pulses are 2+ on the right side and 2+ on the left side  Heart sounds: Normal heart sounds  Pulmonary:      Effort: Pulmonary effort is normal       Breath sounds: Normal breath sounds  No stridor  No decreased breath sounds, wheezing, rhonchi or rales  Chest:      Chest wall: No tenderness  Abdominal:      General: Abdomen is flat  Bowel sounds are normal  There is no distension  Palpations: Abdomen is soft  Abdomen is not rigid  Tenderness: There is abdominal tenderness in the right lower quadrant  There is right CVA tenderness  There is no left CVA tenderness, guarding or rebound  Negative signs include McBurney's sign and psoas sign  Musculoskeletal:         General: Normal range of motion  Cervical back: Full passive range of motion without pain, normal range of motion and neck supple  No rigidity  No spinous process tenderness or muscular tenderness  Normal range of motion  Lymphadenopathy:      Head:      Right side of head: No submental, submandibular, tonsillar, preauricular, posterior auricular or occipital adenopathy        Left side of head: No submental, submandibular, tonsillar, preauricular, posterior auricular or occipital adenopathy  Cervical: No cervical adenopathy  Right cervical: No superficial, deep or posterior cervical adenopathy  Left cervical: No superficial, deep or posterior cervical adenopathy  Skin:     General: Skin is warm  Capillary Refill: Capillary refill takes less than 2 seconds  Neurological:      General: No focal deficit present  Mental Status: She is alert and oriented to person, place, and time  GCS: GCS eye subscore is 4  GCS verbal subscore is 5  GCS motor subscore is 6  Sensory: No sensory deficit  Deep Tendon Reflexes: Reflexes are normal and symmetric  Reflex Scores:       Patellar reflexes are 2+ on the right side and 2+ on the left side  Psychiatric:         Mood and Affect: Mood normal          Speech: Speech normal          Behavior: Behavior normal  Behavior is cooperative  Thought Content:  Thought content normal          Judgment: Judgment normal          Vital Signs  ED Triage Vitals [05/18/23 1805]   Temperature Pulse Respirations Blood Pressure SpO2   98 1 °F (36 7 °C) 100 16 145/71 100 %      Temp Source Heart Rate Source Patient Position - Orthostatic VS BP Location FiO2 (%)   Oral Monitor Sitting Right arm --      Pain Score       7           Vitals:    05/18/23 1805 05/18/23 2050   BP: 145/71 125/82   Pulse: 100 68   Patient Position - Orthostatic VS: Sitting Lying         Visual Acuity      ED Medications  Medications   Famotidine (PF) (PEPCID) injection 20 mg (20 mg Intravenous Given 5/18/23 2024)   sodium chloride 0 9 % bolus 1,000 mL (0 mL Intravenous Stopped 5/18/23 2129)   ketorolac (TORADOL) injection 15 mg (15 mg Intravenous Given 5/18/23 2023)   iohexol (OMNIPAQUE) 240 MG/ML solution 50 mL (50 mL Intravenous Given 5/18/23 2232)   iohexol (OMNIPAQUE) 350 MG/ML injection (SINGLE-DOSE) 100 mL (100 mL Intravenous Given 5/18/23 2231)       Diagnostic Studies  Results Reviewed     Procedure Component Value Units Date/Time    UA w Reflex to Microscopic w Reflex to Culture [[id number]] Collected: 05/18/23 2033    Lab Status: Final result Specimen: Urine, Clean Catch Updated: 05/18/23 2043     Color, UA Light Yellow     Clarity, UA Clear     Specific Gravity, UA 1 028     pH, UA 6 5     Leukocytes, UA Negative     Nitrite, UA Negative     Protein, UA Negative mg/dl      Glucose, UA Negative mg/dl      Ketones, UA Negative mg/dl      Urobilinogen, UA <2 0 mg/dl      Bilirubin, UA Negative     Occult Blood, UA Negative    POCT pregnancy, urine [[id number]]  (Normal) Resulted: 05/18/23 2029    Lab Status: Final result Updated: 05/18/23 2029     EXT Preg Test, Ur Negative     Control Valid    Comprehensive metabolic panel [[id number]]  (Abnormal) Collected: 05/18/23 1938    Lab Status: Final result Specimen: Blood from Arm, Left Updated: 05/18/23 2023     Sodium 137 mmol/L      Potassium 4 1 mmol/L      Chloride 103 mmol/L      CO2 29 mmol/L      ANION GAP 5 mmol/L      BUN 14 mg/dL      Creatinine 0 54 mg/dL      Glucose 79 mg/dL      Calcium 9 4 mg/dL      AST 11 U/L      ALT 8 U/L      Alkaline Phosphatase 51 U/L      Total Protein 7 4 g/dL      Albumin 4 1 g/dL      Total Bilirubin 0 25 mg/dL      eGFR 128 ml/min/1 73sq m     Narrative:      [place] guidelines for Chronic Kidney Disease (CKD):   •  Stage 1 with normal or high GFR (GFR > 90 mL/min/1 73 square meters)  •  Stage 2 Mild CKD (GFR = 60-89 mL/min/1 73 square meters)  •  Stage 3A Moderate CKD (GFR = 45-59 mL/min/1 73 square meters)  •  Stage 3B Moderate CKD (GFR = 30-44 mL/min/1 73 square meters)  •  Stage 4 Severe CKD (GFR = 15-29 mL/min/1 73 square meters)  •  Stage 5 End Stage CKD (GFR <15 mL/min/1 73 square meters)  Note: GFR calculation is accurate only with a steady state creatinine    Lipase [[id number]]  (Normal) Collected: 05/18/23 1938    Lab Status: Final result Specimen: Blood from Arm, Left Updated: 05/18/23 2023     Lipase 38 u/L     CBC and differential [[id number]] Collected: 05/18/23 1938    Lab Status: Final result Specimen: Blood from Arm, Left Updated: 05/18/23 1954     WBC 7 54 Thousand/uL      RBC 4 19 Million/uL      Hemoglobin 12 0 g/dL      Hematocrit 37 1 %      MCV 89 fL      MCH 28 6 pg      MCHC 32 3 g/dL      RDW 13 0 %      MPV 9 8 fL      Platelets 495 Thousands/uL      nRBC 0 /100 WBCs      Neutrophils Relative 48 %      Immat GRANS % 0 %      Lymphocytes Relative 43 %      Monocytes Relative 8 %      Eosinophils Relative 1 %      Basophils Relative 0 %      Neutrophils Absolute 3 59 Thousands/µL      Immature Grans Absolute 0 02 Thousand/uL      Lymphocytes Absolute 3 23 Thousands/µL      Monocytes Absolute 0 58 Thousand/µL      Eosinophils Absolute 0 10 Thousand/µL      Basophils Absolute 0 02 Thousands/µL                  CT abdomen pelvis with contrast   ED Interpretation by Chau Cutler PA-C (05/19 0015)   Feroz Tse MD  [phone] 5/18/2023     Narrative & Impression  CT ABDOMEN AND PELVIS WITH IV CONTRAST     INDICATION:   RLQ abdominal pain (Age >= 14y)  Lower abdominal pain, nausea symptoms      COMPARISON:  None      TECHNIQUE:  CT examination of the abdomen and pelvis was performed  Multiplanar 2D reformatted images were created from the source data      This examination, like all CT scans performed in the [facility], was performed utilizing techniques to minimize radiation dose exposure, including the use of iterative reconstruction and automated exposure control  Radiation dose length   product (DLP) for this visit:  443 mGy-cm     IV Contrast:  100 mL of iohexol (OMNIPAQUE) 50 mL of iohexol (OMNIPAQUE)  Enteric Contrast:  Enteric contrast was not administered      FINDINGS:     ABDOMEN     LOWER CHEST:  No clinically significant abnormality identified in the visualized lower chest      LIVER/BILIARY TREE:  Unremarkable      GALLBLADDER:  No calcified galls   tones   No pericholecystic "inflammatory change      SPLEEN:  Unremarkable      PANCREAS:  Unremarkable      ADRENAL GLANDS:  Unremarkable      KIDNEYS/URETERS:  Unremarkable  No hydronephrosis      STOMACH AND BOWEL: Postsurgical changes at the stomach  Enteric contrast noted reaching the ileocecal junction  No bowel obstruction  No focal pericolonic inflammatory changes      APPENDIX: A normal appendix is felt to be identified 601:74      ABDOMINOPELVIC CAVITY:  No ascites  No pneumoperitoneum  No lymphadenopathy      VESSELS:  Unremarkable for patient's age      PELVIS     REPRODUCTIVE ORGANS:  Unremarkable for patient's age  Evidence of prior tubal ligation     URINARY BLADDER:  Unremarkable      ABDOMINAL WALL/INGUINAL REGIONS:  Unremarkable      OSSEOUS STRUCTURES:  No acute fracture or destructive osseous lesion      IMPRESSION:     No acute inflammatory findings identified in the abdomen or pelvis      Normal appendix      Workstation performed: [id number]           Final Result by Buster Yates MD ([id number])      No acute inflammatory findings identified in the abdomen or pelvis  Normal appendix  Workstation performed: [id number]                    Procedures  Procedures         ED Course                                             Medical Decision Making  Patient is a 26-year-old female with a history of  section x3, gastric bypass 1 year prior to current ED presentation presents emergency department with dull aching persistent worsening right lower quadrant abdominal pain for 4 months  Patient hemodynamically stable and afebrile  No sirs  Clinical blood labs largely unremarkable with normal electrolytes, no leukocytosis, normal kidney function  Urinalysis with no urinary tract infection; negative urine pregnancy  Abdominal pelvis CT with contrast impression-\"with no acute inflammatory findings Benefiel in the abdomen or pelvis  \"  Delivered Pepcid and Toradol in the emergency department; patient demonstrates " decrease in presenting abdominal pain and nausea ED symptomatology status post medication delivery  Prescribed Pepcid and Motrin and counseled patient medication administration and side effects  Follow-up with GI-for possible EGD? Follow-up with PCP  Follow up with emergency department if symptoms persist or exacerbate  Patient demonstrates verbal understanding of all clinical laboratory and imaging findings, discharge instructions, follow-up, and verbally agrees with current treatment plan    Amount and/or Complexity of Data Reviewed  Labs: ordered  Radiology: ordered  Risk  Prescription drug management  Disposition  Final diagnoses:   Gastritis   Nausea     Time reflects when diagnosis was documented in both MDM as applicable and the Disposition within this note     Time User Action Codes Description Comment    5/19/2023 12:16 AM Mayrusty Ragland Add [K29 70] Gastritis     5/19/2023 12:16 AM Chloe Ragland Add [R11 0] Nausea       ED Disposition     ED Disposition   Discharge    Condition   Stable    Date/Time   Fri May 19, 2023 12:16 AM    Comment   Ray Null discharge to home/self care                 Follow-up Information     Follow up With Specialties Details Why Contact Info Additional [address]   [address] [address]  [address]  , [place], [place], [address] [facility] Emergency Department Emergency Medicine   [address] [facility] [address] [facility] Emergency Department, [address], [place], [place], [address] [facility] [address] [facility]   [place] [address] [phone]  [address] [facility] [place], [address] [facility] [address] [place], [place], [address] [phone]          Discharge Medication List as of 5/19/2023 12:17 AM      START taking these medications    Details   famotidine (PEPCID) 20 mg tablet Take 1 tablet (20 mg total) by mouth daily for 3 days, Starting Fri 5/19/2023, Until Mon 5/22/2023, Normal      ondansetron (ZOFRAN) 4 mg tablet Take 1 tablet (4 mg total) by mouth every 8 (eight) hours as needed for nausea or vomiting for up to 3 days, Starting Fri 5/19/2023, Until Mon 5/22/2023 at 2359, Normal         CONTINUE these medications which have NOT CHANGED    Details   MULTIPLE VITAMIN PO Take by mouth, Historical Med             No discharge procedures on file      PDMP Review     None          ED Provider  Electronically Signed by           Virgilio York PA-C  05/19/23 0486

## 2023-05-19 NOTE — DISCHARGE INSTRUCTIONS
Merari Cabezas MD  [phone] 5/18/2023      Narrative & Impression  CT ABDOMEN AND PELVIS WITH IV CONTRAST     INDICATION:   RLQ abdominal pain (Age >= 14y)  Lower abdominal pain, nausea symptoms  COMPARISON:  None  TECHNIQUE:  CT examination of the abdomen and pelvis was performed  Multiplanar 2D reformatted images were created from the source data  This examination, like all CT scans performed in the [facility], was performed utilizing techniques to minimize radiation dose exposure, including the use of iterative reconstruction and automated exposure control  Radiation dose length   product (DLP) for this visit:  443 mGy-cm     IV Contrast:  100 mL of iohexol (OMNIPAQUE) 50 mL of iohexol (OMNIPAQUE)  Enteric Contrast:  Enteric contrast was not administered  FINDINGS:     ABDOMEN     LOWER CHEST:  No clinically significant abnormality identified in the visualized lower chest      LIVER/BILIARY TREE:  Unremarkable  GALLBLADDER:  No calcified gallstones  No pericholecystic inflammatory change  SPLEEN:  Unremarkable  PANCREAS:  Unremarkable  ADRENAL GLANDS:  Unremarkable  KIDNEYS/URETERS:  Unremarkable  No hydronephrosis  STOMACH AND BOWEL: Postsurgical changes at the stomach  Enteric contrast noted reaching the ileocecal junction  No bowel obstruction  No focal pericolonic inflammatory changes  APPENDIX: A normal appendix is felt to be identified 601:74  ABDOMINOPELVIC CAVITY:  No ascites  No pneumoperitoneum  No lymphadenopathy  VESSELS:  Unremarkable for patient's age  PELVIS     REPRODUCTIVE ORGANS:  Unremarkable for patient's age  Evidence of prior tubal ligation     URINARY BLADDER:  Unremarkable  ABDOMINAL WALL/INGUINAL REGIONS:  Unremarkable  OSSEOUS STRUCTURES:  No acute fracture or destructive osseous lesion  IMPRESSION:     No acute inflammatory findings identified in the abdomen or pelvis  Normal appendix  Workstation performed: [id number]

## 2024-09-26 ENCOUNTER — TELEPHONE (OUTPATIENT)
Dept: FAMILY MEDICINE CLINIC | Facility: CLINIC | Age: 29
End: 2024-09-26

## 2024-09-27 NOTE — TELEPHONE ENCOUNTER
09/27/24 3:33 AM        The office's request has been received, reviewed, and the patient chart updated. The PCP has successfully been removed with a patient attribution note. This message will now be completed.        Thank you  Neela Christy